# Patient Record
Sex: MALE | Race: OTHER | NOT HISPANIC OR LATINO
[De-identification: names, ages, dates, MRNs, and addresses within clinical notes are randomized per-mention and may not be internally consistent; named-entity substitution may affect disease eponyms.]

---

## 2023-12-13 PROBLEM — Z00.00 ENCOUNTER FOR PREVENTIVE HEALTH EXAMINATION: Status: ACTIVE | Noted: 2023-12-13

## 2023-12-31 PROBLEM — I10 HYPERTENSION, UNSPECIFIED TYPE: Status: ACTIVE | Noted: 2023-12-31

## 2023-12-31 PROBLEM — E78.5 HLD (HYPERLIPIDEMIA): Status: ACTIVE | Noted: 2023-12-31

## 2023-12-31 PROBLEM — G62.9 NEUROPATHY: Status: ACTIVE | Noted: 2023-12-31

## 2023-12-31 PROBLEM — E11.49 TYPE 2 DIABETES MELLITUS WITH OTHER NEUROLOGIC COMPLICATION: Status: ACTIVE | Noted: 2023-12-31

## 2024-01-04 ENCOUNTER — NON-APPOINTMENT (OUTPATIENT)
Age: 69
End: 2024-01-04

## 2024-01-05 ENCOUNTER — NON-APPOINTMENT (OUTPATIENT)
Age: 69
End: 2024-01-05

## 2024-01-05 ENCOUNTER — TRANSCRIPTION ENCOUNTER (OUTPATIENT)
Age: 69
End: 2024-01-05

## 2024-01-05 ENCOUNTER — APPOINTMENT (OUTPATIENT)
Dept: CT IMAGING | Facility: IMAGING CENTER | Age: 69
End: 2024-01-05
Payer: MEDICARE

## 2024-01-05 ENCOUNTER — OUTPATIENT (OUTPATIENT)
Dept: OUTPATIENT SERVICES | Facility: HOSPITAL | Age: 69
LOS: 1 days | End: 2024-01-05
Payer: MEDICARE

## 2024-01-05 ENCOUNTER — APPOINTMENT (OUTPATIENT)
Dept: SURGICAL ONCOLOGY | Facility: CLINIC | Age: 69
End: 2024-01-05
Payer: MEDICARE

## 2024-01-05 VITALS
WEIGHT: 125 LBS | HEART RATE: 79 BPM | RESPIRATION RATE: 16 BRPM | HEIGHT: 62 IN | DIASTOLIC BLOOD PRESSURE: 77 MMHG | SYSTOLIC BLOOD PRESSURE: 139 MMHG | OXYGEN SATURATION: 99 % | BODY MASS INDEX: 23 KG/M2

## 2024-01-05 DIAGNOSIS — E78.5 HYPERLIPIDEMIA, UNSPECIFIED: ICD-10-CM

## 2024-01-05 DIAGNOSIS — C25.9 MALIGNANT NEOPLASM OF PANCREAS, UNSPECIFIED: ICD-10-CM

## 2024-01-05 DIAGNOSIS — G62.9 POLYNEUROPATHY, UNSPECIFIED: ICD-10-CM

## 2024-01-05 DIAGNOSIS — E11.49 TYPE 2 DIABETES MELLITUS WITH OTHER DIABETIC NEUROLOGICAL COMPLICATION: ICD-10-CM

## 2024-01-05 DIAGNOSIS — I10 ESSENTIAL (PRIMARY) HYPERTENSION: ICD-10-CM

## 2024-01-05 PROCEDURE — 74177 CT ABD & PELVIS W/CONTRAST: CPT | Mod: 26

## 2024-01-05 PROCEDURE — 71260 CT THORAX DX C+: CPT | Mod: 26

## 2024-01-05 PROCEDURE — 71260 CT THORAX DX C+: CPT

## 2024-01-05 PROCEDURE — 74177 CT ABD & PELVIS W/CONTRAST: CPT

## 2024-01-05 PROCEDURE — 99205 OFFICE O/P NEW HI 60 MIN: CPT

## 2024-01-05 NOTE — REASON FOR VISIT
[Initial Consultation] : an initial consultation for [Pancreatic Cancer] : pancreatic cancer [Family Member] : family member

## 2024-01-08 ENCOUNTER — APPOINTMENT (OUTPATIENT)
Dept: RADIATION ONCOLOGY | Facility: CLINIC | Age: 69
End: 2024-01-08
Payer: MEDICARE

## 2024-01-08 VITALS
SYSTOLIC BLOOD PRESSURE: 108 MMHG | BODY MASS INDEX: 22.86 KG/M2 | OXYGEN SATURATION: 99 % | DIASTOLIC BLOOD PRESSURE: 63 MMHG | WEIGHT: 125 LBS | HEART RATE: 71 BPM | RESPIRATION RATE: 16 BRPM

## 2024-01-08 DIAGNOSIS — E11.9 TYPE 2 DIABETES MELLITUS W/OUT COMPLICATIONS: ICD-10-CM

## 2024-01-08 DIAGNOSIS — Z80.0 FAMILY HISTORY OF MALIGNANT NEOPLASM OF DIGESTIVE ORGANS: ICD-10-CM

## 2024-01-08 DIAGNOSIS — I10 ESSENTIAL (PRIMARY) HYPERTENSION: ICD-10-CM

## 2024-01-08 DIAGNOSIS — E78.5 HYPERLIPIDEMIA, UNSPECIFIED: ICD-10-CM

## 2024-01-08 DIAGNOSIS — C25.9 MALIGNANT NEOPLASM OF PANCREAS, UNSPECIFIED: ICD-10-CM

## 2024-01-08 PROCEDURE — 99205 OFFICE O/P NEW HI 60 MIN: CPT | Mod: 25

## 2024-01-08 RX ORDER — PANCRELIPASE 36000; 180000; 114000 [USP'U]/1; [USP'U]/1; [USP'U]/1
CAPSULE, DELAYED RELEASE PELLETS ORAL
Refills: 0 | Status: ACTIVE | COMMUNITY

## 2024-01-08 RX ORDER — DOCUSATE SODIUM 100 MG/1
CAPSULE ORAL
Refills: 0 | Status: ACTIVE | COMMUNITY

## 2024-01-08 RX ORDER — LORATADINE 10 MG
TABLET,DISINTEGRATING ORAL
Refills: 0 | Status: ACTIVE | COMMUNITY

## 2024-01-08 RX ORDER — PANTOPRAZOLE 20 MG/1
20 TABLET, DELAYED RELEASE ORAL
Refills: 0 | Status: ACTIVE | COMMUNITY

## 2024-01-08 RX ORDER — INSULIN GLARGINE 100 [IU]/ML
INJECTION, SOLUTION SUBCUTANEOUS
Refills: 0 | Status: ACTIVE | COMMUNITY

## 2024-01-08 RX ORDER — SENNOSIDES 8.6 MG TABLETS 8.6 MG/1
8.6 TABLET ORAL
Refills: 0 | Status: ACTIVE | COMMUNITY

## 2024-01-08 RX ORDER — ATORVASTATIN CALCIUM 80 MG/1
TABLET, FILM COATED ORAL
Refills: 0 | Status: ACTIVE | COMMUNITY

## 2024-01-08 RX ORDER — INSULIN LISPRO 100 [IU]/ML
100 INJECTION, SOLUTION INTRAVENOUS; SUBCUTANEOUS
Refills: 0 | Status: ACTIVE | COMMUNITY

## 2024-01-08 RX ORDER — PROCHLORPERAZINE MALEATE 10 MG
10 CAPSULE, EXTENDED RELEASE ORAL
Refills: 0 | Status: ACTIVE | COMMUNITY

## 2024-01-08 NOTE — END OF VISIT
[FreeTextEntry3] : By signing my name below, I, Tru Aceves, attest that this documentation has been prepared under the direction and in the presence of Peng Barrientos MD.

## 2024-01-08 NOTE — ASSESSMENT
[FreeTextEntry1] : Mr. YAIR MULLIGAN is a 68-year-old man seeking 2nd opinion regarding management of pancreatic cancer, locally advanced and unresectable, newly diagnosed 09/2023. His PMHx is significant for HTN, HLD, DM Type 2, and mild neuropathy. Patient initially c/o abdominal pain/weight loss which prompted further imaging, with subsequent discovery of a pancreatic head mass; 08/28/2023 A/P CT demonstrated an infiltrative mass in the pancreatic neck measuring 5 x 4 cm, with a nonspecific 2 cm cystic space noted anterior to the mass. 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 Further evaluation via EUS on 09/05/2023 confirmed an irregular hypoechoic mass in the pancreatic neck measuring 3.2 x 2.5 cm. Associated with the mass was an adjacent cyst measuring 3 x 2.4 cm. There was no sign of significant endosonographic abnormality in the CBD & gallbladder. Final pathology from the EUS pancreas FNA was positive for malignant cells and poorly differentiated carcinoma, favor ductal adenocarcinoma. [facility] [place] at that time and determined that he is not a candidate for resection; the patient was initiated on [place]/abraxane D1,8,15 q28 days chemotherapy treatment, C1D1 received 09/20/2023, C2D1 on 10/18/2023, C3D1 on 11/15/2023, and C4D1 12/27/2023 (3 weeks on, 1 week off per cycle; has had 12 so far). Received 4 months of chemo and presents for restaging.  Of note, 10/02/2023 diagnostic molecular genetic report from [facility] [place] [id number] did not show any variant of clinical or uncertain significant in pancreatic cancer panel (JERONIMO, BRCA1, BRCA2, CDK4, CDK N 2A, PAL B2). He has been receiving monthly Ca 19-9 testing, which has been trending down (most recently 1/4/2023: 75; 12/27/2023: 58, 11/15/2023: 49, originally 102 on 08/29/2023). He underwent imaging prior to clinic today. Patient presents today for initial consultation regarding pancreatic cancer management.  I reviewed and discussed the results of the patient's prior and more recent imaging and given his condition I agree with the current treatment plan. I drew a diagram to assist patient and family in better understanding his condition. I believe his tumor has SMA and PV involvement and is thus not technically safely resectable. Explained that in the event of any vascular involvement, we opt to start with chemotherapy and then likely radiation therapy. Per my interpretation, patient's tumor appears to be responding to the [place]/Abraxane, darker & smaller on imaging. Discussed that the best option is to consider stereotactic vs. ablative radiation therapy at this time. Given his good response to the chemotherapy regimen, and the low likelihood of successful surgical resection, I would recommend ablative radiation for him. We will plan to refer him to Dr. Hill to discuss the radiation treatment. Patient can follow up with our office PRN. Of note, the patient's family states that they were unable to get adequate tissue from the original biopsy to do genetic testing.  Should he decide to move forward with radiation here he will need to get fiducials placed and it's reasonable to consider an additional bx at that time to send for additional testing.   Today, I personally spent 60 minutes in total time including reviewing imaging and studies, discussing complex treatment regimens, direct face to face time with the patient, patient education and counseling.

## 2024-01-08 NOTE — HISTORY OF PRESENT ILLNESS
[de-identified] : Mr. YAIR MULLIGAN is a 68-year-old man seeking 2nd opinion regarding management of pancreatic cancer, locally advanced and unresectable, newly diagnosed 09/2023. His PMHx is significant for HTN on Losartan, HLD, DM Type 2 x15 years, and mild neuropathy; FMHx of colon cancer (brother, 25 years). Patient initially c/o abdominal pain/weight loss which prompted further imaging, with subsequent discovery of a pancreatic head mass; 08/28/2023 A/P CT demonstrated an infiltrative mass in the pancreatic neck measuring 5 x 4 cm, with a nonspecific 2 cm cystic space noted anterior to the mass. 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 Further evaluation via EUS on 09/05/2023 confirmed an irregular hypoechoic mass in the pancreatic neck measuring 3.2 x 2.5 cm. Associated with the mass was an adjacent cyst measuring 3 x 2.4 cm. There was no sign of significant endosonographic abnormality in the CBD & gallbladder. Final pathology from the EUS pancreas FNA was positive for malignant cells and poorly differentiated carcinoma, favor ductal adenocarcinoma. [facility] [place] patient at that time and determined that he is not a candidate for resection; the patient was initiated on [place]/abraxane D1,8,15 q28 days chemotherapy treatment, C1D1 received 09/20/2023, C2D1 on 10/18/2023, C3D1 on 11/15/2023, and C4D1 12/27/2023.  Received 4 months of chemo and presents for restaging CT and to discuss treatment options.  Of note, 10/02/2023 diagnostic molecular genetic report from [facility] [place] accession number [id number] did not show any variant of clinical or uncertain significant in pancreatic cancer panel (JERONIMO, BRCA1, BRCA2, CDK4, CDK N 2A, PAL B2). He has been receiving monthly Ca 19-9 testing, which has been trending down (most recent 1/3/2023: 75, originally 102 on 08/29/2023). He underwent imaging prior to clinic today. Patient presents today for initial consultation regarding pancreatic cancer management.  Patient reports feeling well at present and is tolerating the chemotherapy well, which he is receiving in [place]. Notes he was diagnosed with DM 15 years ago.

## 2024-01-08 NOTE — PHYSICAL EXAM
[FreeTextEntry1] : General: No acute distress. Well nourished and well kept. Head: AT/NC Eyes: PERRL. EOMI. Pulmonary: No respiratory distress. Abdomen: Soft. NT/ND. No rebound, no guarding, no rigidity. No peritoneal signs. No masses. Neurological: A&O x 4. Psychiatric: Normal affect and mood.

## 2024-01-09 PROBLEM — C25.9 PANCREAS CANCER: Status: ACTIVE | Noted: 2023-12-13
